# Patient Record
(demographics unavailable — no encounter records)

---

## 2017-02-20 NOTE — RAD
Indication: Cough.



2 views of the chest are reviewed and compared to previous exam dated December 26, 2013.

Patient has had prior right lung lobectomy. Postoperative changes with pleural thickening

is noted in the right costophrenic angle. Left lung field appears hyperinflated. No

definite pneumonia is noted.



IMPRESSION: Postop changes of the right lung field with chronic pleural changes. No

definite pneumonia is identified.

## 2017-02-20 NOTE — ED
Nany BARRON Anna, scribed for Vashti Rowland MD on 02/20/17 at 1927 .





Progress





- Progress Note


Progress Note: 


Patient is a 68 y/o female coming to [facility] presenting with sudden onset of 

intermittent emesis that began five days ago. She is not able to keep down 

fluids. The emesis is triggered by coughing.  She reports frequent coughing. 

She currently sees Dr. Pearson for treatment for breast CA treated with 

chemotherapy. The chemotherapy makes her intermittently nauseous, but her 

current emesis is not at baseline. The symptoms are not alleviated by Zofran.  

Denies fever. She has not been admitted for this previously. Three years ago, 

she had 2 L of fluid in her pleural cavity and was coughing similarly. She did 

not experience emesis at that time. Patient has dry mucous membranes. She is 

currently uncomfortable. Her abd is soft and nontender. She is coughing 

infrequently. 





Course/Dx





- Diagnoses


Provider Diagnoses: 


 Emesis








The documentation as recorded by the Nany logan Anna accurately reflects 

the service I personally performed and the decisions made by me, Vashti Rowland MD.

## 2017-02-20 NOTE — ED
Medical Screening





- HPI Summary


HPI Summary: 


The patient is a 69 year old female presenting to ED for nausea, vomiting, and 

inability to tolerate PO intake today. Reports preceding cough x5 days with post

-tussive emesis. Denies fever, nasal symptoms, chest pain, shortness of breath, 

hemoptysis, abdominal pain, diarrhea, constipation, change in voiding, dysuria, 

hematuria, rash. Indicates current cough feels similar to prior diagnosis of 

right pleural effusion which was secondary to breast CA. Currently undergoing 

daily PO chemotherapy for recurrent Breast CA under oncologist Dr. Pearson. 

Additional history of HTN, hypothyroid, PNA. S/P right mastectomy and c-

section. Denies significant FH. PCP Dr. Bains.





SH: Occasional ETOH. Denies smoking. 





- History of Current Complaint


Chief Complaint: EDNauseaVomitDiarrh


Stated Complaint: VOMITING-CA PT


Time Seen by Provider: 17 17:56





PMH/Surg Hx/FS Hx/Imm Hx


Endocrine/Hematology History: Reports: Hx Thyroid Disease


Cardiovascular History: Reports: Hx Hypertension


Respiratory History: Reports: Hx Pleural Effusion, Hx Pneumonia - several times 

after chemo, Other Respiratory Problems/Disorders - BREAST CA. PLEURAL EFFUSION


Sensory History: Reports: Hx Contacts or Glasses - reading


Opthamlomology History: Reports: Hx Contacts or Glasses - reading


Psychiatric History: 


   Comment Only: Other Psychiatric Issues/Disorders - clostrophobic





- Cancer History


Cancer Type, Location and Year: Right Breast, , Dr. Mao Bajwa Chemotherapy: Yes


Hx Radiation Therapy: Yes


Hx Palliative Cancer Treatment: No





- Surgical History


Surgery Procedure, Year, and Place: rt mastectomy ; 


Hx Anesthesia Reactions: No


Infectious Disease History: No


Infectious Disease History: 


   Denies: History Other Infectious Disease, Traveled Outside the [place] in Last 30 

Days





- Family History


Known Family History: Positive: None





- Social History


Alcohol Use: Occasionally


Substance Use Type: Reports: None


Smoking Status (MU): Never Smoked Tobacco





Review of Systems


Constitutional: Negative


ENT: Negative


Cardiovascular: Negative


Positive: Cough.  Negative: Shortness Of Breath


Positive: Vomiting, Nausea.  Negative: Abdominal Pain, Diarrhea


Genitourinary: Negative


Negative: dysuria, frequency, hematuria


Skin: Negative


Negative: Rash


Neurological: Negative


All Other Systems Reviewed And Are Negative: Yes





Physical Exam


Triage Information Reviewed: Yes


Vital Signs On Initial Exam: 


 Initial Vitals











Temp Pulse Resp BP Pulse Ox


 


 98.2 F   100   16   141/77   100 


 


 17 16:11  17 16:11  17 16:11  17 16:11  17 16:11











Vital Signs Reviewed: Yes


Appearance: Positive: Well-Appearing, Pain Distress - mild pain, no guarding or 

grimace, Obese


Skin: Positive: Warm, Skin Color Reflects Adequate Perfusion, Dry


Head/Face: Positive: Normal Head/Face Inspection


Eyes: Positive: Other: - Anicteric


ENT: Positive: Hearing grossly normal, Other - Oral mucosa moist


Neck: Positive: Supple


Respiratory/Lung Sounds: Positive: Breath Sounds Present, Wheezes - scant 

expiratory wheezing left > right.  Negative: Decreased Breath Sounds, Rales, 

Rhonchi, Subcutaneous Emphysema, Stridor, Unable to speak in full sentences


Cardiovascular: Positive: Normal - radial pulse 2+, RRR, S1, S2.  Negative: 

Murmur, Rub, Tachycardia, Leg Edema Left, Leg Edema Right


Abdomen Description: Positive: Nontender, No Organomegaly, Soft.  Negative: 

Distended, Guarding, Peritoneal Signs


Bowel Sounds: Positive: Present


Musculoskeletal: Positive: Normal - AROM all extremities


Neurological: Positive: Normal - awake, alert, Facial Symmetry, Speech Normal


Psychiatric: Positive: Normal


AVPU Assessment: Alert





- [place] Coma Scale


Coma Scale Total: 15





Diagnostics





- Vital Signs


 Vital Signs











  Temp Pulse Resp BP Pulse Ox


 


 17 19:00   95   143/73  98


 


 17 18:44   98    96


 


 17 18:42     137/75 


 


 17 17:25  97.7 F  100  20  114/70  100


 


 17 16:11  98.2 F  100  16  141/77  100














- Laboratory


Lab Results: 


 Lab Results











  17 Range/Units





  18:30 18:30 


 


WBC  5.6   (3.5-10.8)  10^3/ul


 


RBC  3.44 L   (4.0-5.4)  10^6/ul


 


Hgb  12.4   (12.0-16.0)  g/dl


 


Hct  36   (35-47)  %


 


MCV  104 H   (80-97)  fL


 


MCH  36 H   (27-31)  pg


 


MCHC  35   (31-36)  g/dl


 


RDW  14   (10.5-15)  %


 


Plt Count  392   (150-450)  10^3/ul


 


MPV  8   (7.4-10.4)  um3


 


Neut % (Auto)  83.1 H   (38-83)  %


 


Lymph % (Auto)  11.2 L   (25-47)  %


 


Mono % (Auto)  4.6   (1-9)  %


 


Eos % (Auto)  0.1   (0-6)  %


 


Baso % (Auto)  1.0   (0-2)  %


 


Absolute Neuts (auto)  4.6   (1.5-7.7)  10^3/ul


 


Absolute Lymphs (auto)  0.6 L   (1.0-4.8)  10^3/ul


 


Absolute Monos (auto)  0.3   (0-0.8)  10^3/ul


 


Absolute Eos (auto)  0   (0-0.6)  10^3/ul


 


Absolute Basos (auto)  0.1   (0-0.2)  10^3/ul


 


Absolute Nucleated RBC  0.01   10^3/ul


 


Nucleated RBC %  0.1   


 


Sodium   137  (133-145)  mmol/L


 


Potassium   3.7  (3.5-5.0)  mmol/L


 


Chloride   98 L  (101-111)  mmol/L


 


Carbon Dioxide   29  (22-32)  mmol/L


 


Anion Gap   10  (2-11)  mmol/L


 


BUN   22  (6-24)  mg/dL


 


Creatinine   1.09 H  (0.51-0.95)  mg/dL


 


Est GFR ( Amer)   64.0  (>60)  


 


Est GFR (Non-Af Amer)   49.8  (>60)  


 


BUN/Creatinine Ratio   20.2 H  (8-20)  


 


Glucose   134 H  ()  mg/dL


 


Calcium   10.0  (8.6-10.3)  mg/dL


 


Total Bilirubin   0.50  (0.2-1.0)  mg/dL


 


AST   16  (13-39)  U/L


 


ALT   11  (7-52)  U/L


 


Alkaline Phosphatase   96  ()  U/L


 


C-Reactive Protein   25.43 H  (< 5.00)  mg/L


 


Total Protein   7.8  (6.4-8.9)  g/dL


 


Albumin   4.2  (3.2-5.2)  g/dL


 


Globulin   3.6  (2-4)  g/dL


 


Albumin/Globulin Ratio   1.2  (1-3)  


 


Lipase   < 10 L  (11.0-82.0)  U/L











Result Diagrams: 


 17 18:30





 17 18:30


Lab Statement: Any lab studies that have been ordered have been reviewed, and 

results considered in the medical decision making process.





Re-Evaluation





- Re-Evaluation


  ** First Eval


Re-Evaluation Time: 20:03


Change: Improved


Comment: Patient reports mild persistent nausea without recurrent vomiting. 

Discussed lab and imaging results. Given water, gingerale, crackers, and apple 

sauce for PO challenge.





  ** Second Eval


Re-Evaluation Time: 20:57


Change: Improved


Comment: Patient reports feeling improved after duoneb. Tolerated PO liquids 

and crackers. Is taking PO chemo med. Anticipate discharge.





Course/Dx





- Diagnoses


Provider Diagnoses: 


 Emesis, Dehydration, Bronchospasm








Discharge





- Discharge Plan


Condition: Stable


Disposition: HOME


Prescriptions: 


Albuterol HFA INHALER* [Ventolin HFA Inhaler*] 1 puff INH Q6H PRN #1 mdi


 PRN Reason: Sob/Wheezing


guaiFENesin/CODIEN 100MG-10MG* [Robitussin AC 100Mg-10Mg*] 10 ml PO Q4H PRN #

300 ml MDD 60 mL


 PRN Reason: Cough


Patient Education Materials:  Bronchospasm (ED), Acute Nausea and Vomiting (ED)

, Dehydration (ED)


Referrals: 


Merlyn Bains MD [Primary Care Provider] - 


Dieter Pearson MD [Medical Doctor] -